# Patient Record
Sex: FEMALE | Race: WHITE | URBAN - METROPOLITAN AREA
[De-identification: names, ages, dates, MRNs, and addresses within clinical notes are randomized per-mention and may not be internally consistent; named-entity substitution may affect disease eponyms.]

---

## 2023-01-24 ENCOUNTER — OFFICE VISIT (OUTPATIENT)
Dept: URBAN - METROPOLITAN AREA CLINIC 74 | Facility: CLINIC | Age: 63
End: 2023-01-24
Payer: COMMERCIAL

## 2023-01-24 VITALS
TEMPERATURE: 97.5 F | HEART RATE: 60 BPM | WEIGHT: 265.4 LBS | SYSTOLIC BLOOD PRESSURE: 149 MMHG | DIASTOLIC BLOOD PRESSURE: 80 MMHG | BODY MASS INDEX: 44.22 KG/M2 | HEIGHT: 65 IN

## 2023-01-24 DIAGNOSIS — Z79.899 LONG TERM USE OF DRUG: ICD-10-CM

## 2023-01-24 DIAGNOSIS — E88.81 METABOLIC SYNDROME: ICD-10-CM

## 2023-01-24 DIAGNOSIS — R74.01 TRANSAMINITIS: ICD-10-CM

## 2023-01-24 DIAGNOSIS — E66.01 MORBID (SEVERE) OBESITY DUE TO EXCESS CALORIES: ICD-10-CM

## 2023-01-24 PROCEDURE — 99203 OFFICE O/P NEW LOW 30 MIN: CPT | Performed by: PHYSICIAN ASSISTANT

## 2023-01-24 NOTE — HPI-TODAY'S VISIT:
The patient is 62-year-old female with past medical history as noted below is prsenting to our clinic today with elevated LFT's. No previous Colonoscopy. Cologuard negative in 2021. She had multiple labs since last year with elevated LFT's. She has been taking NSAID's and Tylenol daily prior to her knee surgery in 01/2023. She also drinks ETOH  2-3 glasses per day. None for three weeks. No family history of liver disease. No family history of colon cancer or colon polyps.  No other GI issues.   -- The patient denies dyspepsia, dysphagia, odynophagia, hemoptysis, hematemesis, nausea, vomiting, regurgitation, melena, constipation, diarrhea, abdominal pain, hematochezia, fever, chills, chest pain, SOB, or any other GI complaints today. -- The patient denies Tobacco, and Illicit drug use. She drinks wine daily.  -- The patient is not up to date with Flu and COVID vaccine.

## 2023-01-31 ENCOUNTER — OFFICE VISIT (OUTPATIENT)
Dept: URBAN - METROPOLITAN AREA CLINIC 73 | Facility: CLINIC | Age: 63
End: 2023-01-31
Payer: COMMERCIAL

## 2023-01-31 DIAGNOSIS — K76.0 FATTY (CHANGE OF) LIVER: ICD-10-CM

## 2023-01-31 PROCEDURE — 76705 ECHO EXAM OF ABDOMEN: CPT | Performed by: INTERNAL MEDICINE

## 2023-02-08 LAB
% SATURATION: 17
A/G RATIO: 1.6
ABSOLUTE BASOPHILS: 41
ABSOLUTE EOSINOPHILS: 178
ABSOLUTE LYMPHOCYTES: 1563
ABSOLUTE MONOCYTES: 429
ABSOLUTE NEUTROPHILS: 5889
ACTIN (SMOOTH MUSCLE) ANTIBODY (IGG): <20
ALBUMIN: 4.2
ALKALINE PHOSPHATASE: 93
ALPHA-1-ANTITRYPSIN QN: 247
ALT (SGPT): 27
ANA SCREEN, IFA: POSITIVE
AST (SGOT): 32
BASOPHILS: 0.5
BILIRUBIN, TOTAL: 0.6
BUN/CREATININE RATIO: (no result)
BUN: 10
CALCIUM: 9.9
CARBON DIOXIDE, TOTAL: 25
CERULOPLASMIN: 38
CHLORIDE: 107
CREATININE: 0.82
EGFR: 80
EOSINOPHILS: 2.2
FERRITIN: 195
GGT: 89
GLOBULIN, TOTAL: 2.6
GLUCOSE: 90
HBSAG SCREEN: (no result)
HEMATOCRIT: 37.3
HEMOGLOBIN: 13
HEP A AB, IGM: (no result)
HEP B CORE AB, IGM: (no result)
HEP C VIRUS AB: <0.02
HEPATITIS C ANTIBODY: (no result)
HEREDITARY HEMOCHROMATOSIS DNA MUT: (no result)
IMMUNOGLOBULIN A: 231
INTERPRETATION: (no result)
INTERPRETATION: (no result)
IRON BINDING CAPACITY: 338
IRON, TOTAL: 57
LYMPHOCYTES: 19.3
MCH: 32.3
MCHC: 34.9
MCV: 92.8
MITOCHONDRIAL (M2) ANTIBODY: <=20
MONOCYTES: 5.3
MPV: 9.9
NEUTROPHILS: 72.7
PLATELET COUNT: 303
POTASSIUM: 4.3
PROTEIN, TOTAL: 6.8
RDW: 12.4
RED BLOOD CELL COUNT: 4.02
RHEUMATOID FACTOR: <14
SJOGREN'S ANTIBODY (SS-A): (no result)
SJOGREN'S ANTIBODY (SS-B): (no result)
SODIUM: 142
TISSUE TRANSGLUTAMINASE AB, IGA: <1
WHITE BLOOD CELL COUNT: 8.1

## 2023-02-15 ENCOUNTER — OFFICE VISIT (OUTPATIENT)
Dept: URBAN - METROPOLITAN AREA CLINIC 74 | Facility: CLINIC | Age: 63
End: 2023-02-15

## 2023-02-15 NOTE — HPI-TODAY'S VISIT:
The patient is 62-year-old female known to Dr. Park is here today to discuss her recent labs and US results. Needs to schedule EGD and Colonoscopy.   -- The patient denies dyspepsia, dysphagia, odynophagia, hemoptysis, hematemesis, nausea, vomiting, regurgitation, melena, constipation, diarrhea, abdominal pain, hematochezia, fever, chills, chest pain, SOB, or any other GI complaints today. -- The patient denies Tobacco, and Illicit drug use. She drinks wine daily.  -- The patient is not up to date with Flu and COVID vaccine.  Diagnostic Studies; -- US on 01/31/2023 with hepatic steatosis otherwise, normal.   -- Labs on 01/24/2023 as noted below.

## 2023-02-17 PROBLEM — 408512008: Status: ACTIVE | Noted: 2023-01-24

## 2023-02-17 PROBLEM — 237602007: Status: ACTIVE | Noted: 2023-01-24

## 2023-02-17 PROBLEM — 83911000119104: Status: ACTIVE | Noted: 2023-01-24

## 2023-02-22 ENCOUNTER — DASHBOARD ENCOUNTERS (OUTPATIENT)
Age: 63
End: 2023-02-22

## 2023-02-28 ENCOUNTER — OFFICE VISIT (OUTPATIENT)
Dept: URBAN - METROPOLITAN AREA CLINIC 74 | Facility: CLINIC | Age: 63
End: 2023-02-28

## 2023-02-28 NOTE — HPI-TODAY'S VISIT:
The patient is 62-year-old female known to Dr. Park is here today to discuss her recent labs and US results. Needs to schedule EGD and Colonoscopy.   -- The patient denies dyspepsia, dysphagia, odynophagia, hemoptysis, hematemesis, nausea, vomiting, regurgitation, melena, constipation, diarrhea, abdominal pain, hematochezia, fever, chills, chest pain, SOB, or any other GI complaints today. -- The patient denies Tobacco, and Illicit drug use. She drinks wine daily.  -- The patient is not up to date with Flu and COVID vaccine.  Diagnostic Studies; -- RUQ US on 01/31/2023 with hepatic steatosis otherwise, normal.   -- Labs on 01/24/2023 as noted below.